# Patient Record
Sex: FEMALE | Race: WHITE | NOT HISPANIC OR LATINO
[De-identification: names, ages, dates, MRNs, and addresses within clinical notes are randomized per-mention and may not be internally consistent; named-entity substitution may affect disease eponyms.]

---

## 2019-01-25 PROBLEM — Z00.00 ENCOUNTER FOR PREVENTIVE HEALTH EXAMINATION: Status: ACTIVE | Noted: 2019-01-25

## 2019-01-29 ENCOUNTER — FORM ENCOUNTER (OUTPATIENT)
Age: 49
End: 2019-01-29

## 2019-01-30 ENCOUNTER — OUTPATIENT (OUTPATIENT)
Dept: OUTPATIENT SERVICES | Facility: HOSPITAL | Age: 49
LOS: 1 days | End: 2019-01-30
Payer: COMMERCIAL

## 2019-01-30 ENCOUNTER — APPOINTMENT (OUTPATIENT)
Dept: RADIOLOGY | Facility: CLINIC | Age: 49
End: 2019-01-30

## 2019-01-30 ENCOUNTER — APPOINTMENT (OUTPATIENT)
Dept: ORTHOPEDIC SURGERY | Facility: CLINIC | Age: 49
End: 2019-01-30
Payer: COMMERCIAL

## 2019-01-30 VITALS — WEIGHT: 170 LBS | HEIGHT: 64 IN | BODY MASS INDEX: 29.02 KG/M2

## 2019-01-30 DIAGNOSIS — M24.572 CONTRACTURE, LEFT ANKLE: ICD-10-CM

## 2019-01-30 DIAGNOSIS — M24.571 CONTRACTURE, RIGHT ANKLE: ICD-10-CM

## 2019-01-30 DIAGNOSIS — Z80.9 FAMILY HISTORY OF MALIGNANT NEOPLASM, UNSPECIFIED: ICD-10-CM

## 2019-01-30 DIAGNOSIS — Q66.7 CONGENITAL PES CAVUS: ICD-10-CM

## 2019-01-30 DIAGNOSIS — Z78.9 OTHER SPECIFIED HEALTH STATUS: ICD-10-CM

## 2019-01-30 PROCEDURE — 73610 X-RAY EXAM OF ANKLE: CPT | Mod: 26,50

## 2019-01-30 PROCEDURE — 73610 X-RAY EXAM OF ANKLE: CPT

## 2019-01-30 PROCEDURE — 73630 X-RAY EXAM OF FOOT: CPT

## 2019-01-30 PROCEDURE — 99203 OFFICE O/P NEW LOW 30 MIN: CPT

## 2019-01-30 PROCEDURE — 73630 X-RAY EXAM OF FOOT: CPT | Mod: 26,50

## 2019-01-30 RX ORDER — MELOXICAM 15 MG/1
15 TABLET ORAL
Qty: 30 | Refills: 2 | Status: ACTIVE | COMMUNITY
Start: 2019-01-30 | End: 1900-01-01

## 2019-02-02 PROBLEM — M24.571 EQUINUS CONTRACTURE OF RIGHT ANKLE: Status: ACTIVE | Noted: 2019-01-30

## 2019-02-02 PROBLEM — M24.572 EQUINUS CONTRACTURE OF LEFT ANKLE: Status: ACTIVE | Noted: 2019-01-30

## 2019-02-02 PROBLEM — Q66.7 PES CAVUS: Status: ACTIVE | Noted: 2019-01-30

## 2019-02-02 NOTE — DISCUSSION/SUMMARY
[de-identified] : 49  year old woman with pes cavus deformities and equinus contractures with bilateral foot pain for the past several weeks. Her exam today is not classic for any specific foot and ankle pathology. We discussed possible rheumatolgic origin of her pain vs compressive nerve pain from her shoe wear. There is potential overload of her midfoot and forefoot from her equinus contractures and pes cavus, however, this is not a classic presentation \par 1. Follow up in 4-5 weeks, no new images at that time\par 2. She was given a referral for physical therapy and achilles home exercise protocol \par 3. Pt was given a prescription for meloxicam 15 mg po 1x/day and instructed to discontinue any other NSAID use\par 4. Labs drawn ESR CRP MONICA RF

## 2019-02-02 NOTE — PHYSICAL EXAM
[de-identified] : General: Patient is awake and alert, demonstrates appropriate mood and affect, exhibits normal breathing and is in no acute distress.\par Psych: The patient is appropriately dressed and groomed, maintains good eye contact. Alert and oriented x 3. Normal attention/concentration, fund of knowledge and recall. Normal speech rate and rhythm. No hallucinations, suicidal or homicidal ideations. Demonstrates expected level of insight and judgment regarding health.\par Skin: The patient has no chronic skin lesions, rashes, or ulcers. There is no induration or erythema of uninvolved extremities. For skin exam of involved extremity refer to detailed musculoskeletal/extremity exam. \par Cardiovascular: No visible jugular venous distention. There is brisk capillary refill in the digits of the affected extremity. They are symmetric pulses in the bilateral upper and lower extremities. \par Respiratory: The patient is in no apparent respiratory distress. They're taking full deep breaths with normal excursion, without use of accessory muscles or evidence of audible wheezes or stridor without the use of a stethoscope. \par Neurological: 5/5 motor strength and sensation intact throughout uninvolved upper and lower extremities, refer to detailed musculoskeletal exam regarding involved extremity\par Neck: Full range of motion with flexion, extension, rotation, and side bending; no palpable crepitus, normal alignment and lordosis, symmetric appearance, midline trachea, no thyroid hypertrophy or nodules																							\par Pes cavus deformities with neutral hindfoot alignment \par Bilateral: \par With her knees extended she gets to neutral bilaterally. There is no TTP about the MT shafts. No TTP about the hallux MTPJ. \par Appearance: Skin is intact. There is no skin breakdown. There were no lesions. There is no erythema.\par Vascular: The patient has a warm and well-perfused foot. There is a palpable 2+ dorsalis pedis and posterior tibialis artery. There is brisk capillary refill to all 5 digits.\par Sensation intact to light touch in saphenous, sural, superficial peroneal, deep peroneal, and tibial nerve distributions. \par Motors: Patient is able to fire extensor hallucis longus, flexor hallucis longus, tibialis anterior, gastrocsoleus complex. The patient has 5 out of 5 strength in all myotomes.  [de-identified] : weightbearing Bilateral foot and ankle xrays were reviewed today. no fractures subluxation or dislocations. There is mild joint space narrowing about the 2nd and 3rd TMT joints. ankle mortise intact.

## 2019-02-02 NOTE — HISTORY OF PRESENT ILLNESS
[FreeTextEntry1] : Referring Provider: N/A\par Chief complaint: bilateral foot and ankle pain \par Date of injury/onset:  1/7/19 onset\par \par 49 year y o female presents for an evaluation of bilateral foot and ankle pain. She states this pain has progressively gotten worse and she is in pain more while walking. She describes the pain in her feet as if she has walked in heels all day. She reports this being a new issue but more daily now and she states this all started since her doctor has prescribed her Cipro in the beginning of January where she caught a major allergic reaction and tingling towards her feet. Denies any PMH of autoimmune or rheumatologic conditions.\par

## 2019-02-05 LAB
BASOPHILS # BLD AUTO: 0.02 K/UL
BASOPHILS NFR BLD AUTO: 0.5 %
CRP SERPL-MCNC: <0.1 MG/DL
EOSINOPHIL # BLD AUTO: 0.05 K/UL
EOSINOPHIL NFR BLD AUTO: 1.2 %
ERYTHROCYTE [SEDIMENTATION RATE] IN BLOOD BY WESTERGREN METHOD: 10 MM/HR
HCT VFR BLD CALC: 43.2 %
HGB BLD-MCNC: 13.2 G/DL
IMM GRANULOCYTES NFR BLD AUTO: 0.2 %
LYMPHOCYTES # BLD AUTO: 1.41 K/UL
LYMPHOCYTES NFR BLD AUTO: 33.3 %
MAN DIFF?: NORMAL
MCHC RBC-ENTMCNC: 29.4 PG
MCHC RBC-ENTMCNC: 30.6 GM/DL
MCV RBC AUTO: 96.2 FL
MONOCYTES # BLD AUTO: 0.32 K/UL
MONOCYTES NFR BLD AUTO: 7.6 %
NEUTROPHILS # BLD AUTO: 2.42 K/UL
NEUTROPHILS NFR BLD AUTO: 57.2 %
PLATELET # BLD AUTO: 263 K/UL
RBC # BLD: 4.49 M/UL
RBC # FLD: 14.3 %
RHEUMATOID FACT SER QL: <10 IU/ML
WBC # FLD AUTO: 4.23 K/UL

## 2019-02-06 LAB — ANA SER IF-ACNC: NEGATIVE

## 2019-03-06 ENCOUNTER — APPOINTMENT (OUTPATIENT)
Dept: ORTHOPEDIC SURGERY | Facility: CLINIC | Age: 49
End: 2019-03-06

## 2021-07-02 ENCOUNTER — APPOINTMENT (OUTPATIENT)
Dept: UROLOGY | Facility: CLINIC | Age: 51
End: 2021-07-02
Payer: COMMERCIAL

## 2021-07-02 VITALS
SYSTOLIC BLOOD PRESSURE: 115 MMHG | TEMPERATURE: 97.6 F | OXYGEN SATURATION: 99 % | HEART RATE: 69 BPM | DIASTOLIC BLOOD PRESSURE: 65 MMHG

## 2021-07-02 DIAGNOSIS — Z82.49 FAMILY HISTORY OF ISCHEMIC HEART DISEASE AND OTHER DISEASES OF THE CIRCULATORY SYSTEM: ICD-10-CM

## 2021-07-02 DIAGNOSIS — R39.15 URGENCY OF URINATION: ICD-10-CM

## 2021-07-02 DIAGNOSIS — Z80.0 FAMILY HISTORY OF MALIGNANT NEOPLASM OF DIGESTIVE ORGANS: ICD-10-CM

## 2021-07-02 DIAGNOSIS — M62.89 OTHER SPECIFIED DISORDERS OF MUSCLE: ICD-10-CM

## 2021-07-02 DIAGNOSIS — Z84.1 FAMILY HISTORY OF DISORDERS OF KIDNEY AND URETER: ICD-10-CM

## 2021-07-02 PROCEDURE — 99072 ADDL SUPL MATRL&STAF TM PHE: CPT

## 2021-07-02 PROCEDURE — 99204 OFFICE O/P NEW MOD 45 MIN: CPT

## 2021-07-08 DIAGNOSIS — R31.29 OTHER MICROSCOPIC HEMATURIA: ICD-10-CM

## 2021-07-13 ENCOUNTER — APPOINTMENT (OUTPATIENT)
Dept: UROLOGY | Facility: CLINIC | Age: 51
End: 2021-07-13
Payer: COMMERCIAL

## 2021-07-13 DIAGNOSIS — R31.29 OTHER MICROSCOPIC HEMATURIA: ICD-10-CM

## 2021-07-13 PROCEDURE — 99072 ADDL SUPL MATRL&STAF TM PHE: CPT

## 2021-07-13 PROCEDURE — 52000 CYSTOURETHROSCOPY: CPT

## 2021-07-14 LAB
APPEARANCE: CLEAR
BACTERIA UR CULT: NORMAL
BACTERIA: NEGATIVE
BILIRUBIN URINE: NEGATIVE
BLOOD URINE: NEGATIVE
CALCIUM OXALATE CRYSTALS: ABNORMAL
COLOR: YELLOW
GLUCOSE QUALITATIVE U: NEGATIVE
HYALINE CASTS: 2 /LPF
KETONES URINE: NORMAL
LEUKOCYTE ESTERASE URINE: NEGATIVE
MICROSCOPIC-UA: NORMAL
NITRITE URINE: NEGATIVE
PH URINE: 6
PROTEIN URINE: NORMAL
RED BLOOD CELLS URINE: 3 /HPF
SPECIFIC GRAVITY URINE: 1.03
SQUAMOUS EPITHELIAL CELLS: 7 /HPF
UROBILINOGEN URINE: NORMAL
WHITE BLOOD CELLS URINE: 2 /HPF

## 2021-07-15 LAB
BILIRUB UR QL STRIP: NORMAL
CLARITY UR: CLEAR
COLLECTION METHOD: NORMAL
GLUCOSE UR-MCNC: NORMAL
HCG UR QL: 0.2 EU/DL
HGB UR QL STRIP.AUTO: NORMAL
KETONES UR-MCNC: NORMAL
LEUKOCYTE ESTERASE UR QL STRIP: NORMAL
NITRITE UR QL STRIP: NORMAL
PH UR STRIP: 5
PROT UR STRIP-MCNC: NORMAL
SP GR UR STRIP: >=1.03

## 2021-07-16 ENCOUNTER — APPOINTMENT (OUTPATIENT)
Dept: ULTRASOUND IMAGING | Facility: CLINIC | Age: 51
End: 2021-07-16
Payer: COMMERCIAL

## 2021-07-16 ENCOUNTER — RESULT REVIEW (OUTPATIENT)
Age: 51
End: 2021-07-16

## 2021-07-16 PROCEDURE — 76770 US EXAM ABDO BACK WALL COMP: CPT | Mod: 26

## 2024-01-08 ENCOUNTER — NON-APPOINTMENT (OUTPATIENT)
Age: 54
End: 2024-01-08

## 2024-01-10 ENCOUNTER — APPOINTMENT (OUTPATIENT)
Dept: HEART AND VASCULAR | Facility: CLINIC | Age: 54
End: 2024-01-10
Payer: COMMERCIAL

## 2024-01-10 VITALS
BODY MASS INDEX: 28.17 KG/M2 | HEART RATE: 74 BPM | OXYGEN SATURATION: 97 % | WEIGHT: 165 LBS | DIASTOLIC BLOOD PRESSURE: 70 MMHG | TEMPERATURE: 98.3 F | HEIGHT: 64 IN | SYSTOLIC BLOOD PRESSURE: 110 MMHG

## 2024-01-10 DIAGNOSIS — R94.31 ABNORMAL ELECTROCARDIOGRAM [ECG] [EKG]: ICD-10-CM

## 2024-01-10 DIAGNOSIS — E78.00 PURE HYPERCHOLESTEROLEMIA, UNSPECIFIED: ICD-10-CM

## 2024-01-10 PROCEDURE — 99204 OFFICE O/P NEW MOD 45 MIN: CPT

## 2024-01-10 NOTE — ASSESSMENT
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  Follow K and Mg.  Be alert to pills that prolong QTi.  Using Zyrtec PRN. She should stop this in case she has a congenital predisposition to a long QTi as antihistimines prolong the QTi..  We discussed clearing meds with me. Echo ordered.  ZIO monitor placed. No need for meds at present.

## 2024-01-10 NOTE — REASON FOR VISIT
[FreeTextEntry1] : 52 y/o F with a QTi of 470.  No h/o syncope.  No family h/o sudden death. Pt had palps around [place] time.

## 2025-01-03 ENCOUNTER — APPOINTMENT (OUTPATIENT)
Dept: HEART AND VASCULAR | Facility: CLINIC | Age: 55
End: 2025-01-03
Payer: COMMERCIAL

## 2025-01-03 VITALS
HEART RATE: 68 BPM | HEIGHT: 64 IN | OXYGEN SATURATION: 97 % | SYSTOLIC BLOOD PRESSURE: 112 MMHG | DIASTOLIC BLOOD PRESSURE: 68 MMHG | TEMPERATURE: 98.1 F | WEIGHT: 156 LBS | BODY MASS INDEX: 26.63 KG/M2

## 2025-01-03 DIAGNOSIS — R94.31 ABNORMAL ELECTROCARDIOGRAM [ECG] [EKG]: ICD-10-CM

## 2025-01-03 DIAGNOSIS — E78.00 PURE HYPERCHOLESTEROLEMIA, UNSPECIFIED: ICD-10-CM

## 2025-01-03 DIAGNOSIS — Z82.49 FAMILY HISTORY OF ISCHEMIC HEART DISEASE AND OTHER DISEASES OF THE CIRCULATORY SYSTEM: ICD-10-CM

## 2025-01-03 PROCEDURE — 99213 OFFICE O/P EST LOW 20 MIN: CPT

## 2025-01-03 RX ORDER — ROSUVASTATIN CALCIUM 10 MG/1
10 TABLET, FILM COATED ORAL DAILY
Qty: 90 | Refills: 1 | Status: ACTIVE | COMMUNITY
Start: 2025-01-03

## 2025-01-07 ENCOUNTER — APPOINTMENT (OUTPATIENT)
Dept: HEART AND VASCULAR | Facility: CLINIC | Age: 55
End: 2025-01-07
Payer: COMMERCIAL

## 2025-01-07 PROCEDURE — 93306 TTE W/DOPPLER COMPLETE: CPT

## 2025-01-14 ENCOUNTER — NON-APPOINTMENT (OUTPATIENT)
Age: 55
End: 2025-01-14

## 2025-01-14 ENCOUNTER — APPOINTMENT (OUTPATIENT)
Dept: UROLOGY | Facility: CLINIC | Age: 55
End: 2025-01-14
Payer: COMMERCIAL

## 2025-01-14 VITALS
DIASTOLIC BLOOD PRESSURE: 83 MMHG | TEMPERATURE: 98.1 F | HEART RATE: 60 BPM | OXYGEN SATURATION: 98 % | SYSTOLIC BLOOD PRESSURE: 121 MMHG

## 2025-01-14 DIAGNOSIS — M62.89 OTHER SPECIFIED DISORDERS OF MUSCLE: ICD-10-CM

## 2025-01-14 DIAGNOSIS — R39.15 URGENCY OF URINATION: ICD-10-CM

## 2025-01-14 PROCEDURE — 99204 OFFICE O/P NEW MOD 45 MIN: CPT
